# Patient Record
Sex: FEMALE | Race: WHITE | NOT HISPANIC OR LATINO | URBAN - METROPOLITAN AREA
[De-identification: names, ages, dates, MRNs, and addresses within clinical notes are randomized per-mention and may not be internally consistent; named-entity substitution may affect disease eponyms.]

---

## 2019-12-18 NOTE — PATIENT DISCUSSION
"""Recommended OTC artificial tears two - four times a day as needed. "" ""Continue Artificial tears both eyes two - four times a day

## 2022-07-28 NOTE — PATIENT DISCUSSION
Will send today's finding to patient's PCP.  Will recommend a carotid doppler as patient denies ever having had one done in the past.

## 2022-07-28 NOTE — PATIENT DISCUSSION
Symptoms started Monday. Patient reports difficult to obtain BP. BP was taking in office today and was 176/99 HR 71.

## 2022-09-02 NOTE — PATIENT DISCUSSION
Patient would like a new eyeglass prescription. Will call when she has money saved up to purchase new glasses. Will schedule patient for refraction when she calls.

## 2022-09-02 NOTE — PATIENT DISCUSSION
Patient sees PCP Dr. Sofia Sutton next week and will have results sent to us. Patient reports no new episodes of vison loss in several weeks. Will send today's visit to Dr. Kevin Camargo.

## 2022-09-02 NOTE — PATIENT DISCUSSION
Patient uses [employer]ilason Allergy eye drop. Finds relief with those drops. Provided patient sample of Pataday she can try and a coupon.

## 2023-04-17 ENCOUNTER — CONTACT LENSES/GLASSES VISIT (OUTPATIENT)
Dept: URBAN - METROPOLITAN AREA CLINIC 24 | Facility: CLINIC | Age: 28
End: 2023-04-17

## 2023-04-17 DIAGNOSIS — H18.603: ICD-10-CM

## 2023-04-17 PROCEDURE — 92310N CONTACT LENS F/U, 3 OR LESS N/C

## 2023-05-10 ENCOUNTER — CONTACT LENSES/GLASSES VISIT (OUTPATIENT)
Dept: URBAN - METROPOLITAN AREA CLINIC 50 | Facility: CLINIC | Age: 28
End: 2023-05-10

## 2023-05-10 DIAGNOSIS — H18.603: ICD-10-CM

## 2023-05-10 PROCEDURE — 92310N CONTACT LENS F/U, 3 OR LESS N/C

## 2023-06-30 ENCOUNTER — CONTACT LENSES/GLASSES VISIT (OUTPATIENT)
Dept: URBAN - METROPOLITAN AREA CLINIC 24 | Facility: CLINIC | Age: 28
End: 2023-06-30

## 2023-06-30 DIAGNOSIS — H18.603: ICD-10-CM

## 2023-06-30 PROCEDURE — 92310N CONTACT LENS F/U, 3 OR LESS N/C

## 2023-06-30 ASSESSMENT — VISUAL ACUITY
OD_CC: 20/60
OS_CC: 20/100-1

## 2023-07-17 ENCOUNTER — CONTACT LENSES/GLASSES VISIT (OUTPATIENT)
Dept: URBAN - METROPOLITAN AREA CLINIC 24 | Facility: CLINIC | Age: 28
End: 2023-07-17

## 2023-07-17 DIAGNOSIS — H18.603: ICD-10-CM

## 2023-07-17 PROCEDURE — 92310N CONTACT LENS F/U, 3 OR LESS N/C

## 2023-07-17 ASSESSMENT — VISUAL ACUITY
OU_CC: 20/15
OS_CC: 20/60+2
OD_CC: 20/15

## 2023-11-10 ENCOUNTER — COMPREHENSIVE EXAM (OUTPATIENT)
Dept: URBAN - METROPOLITAN AREA CLINIC 24 | Facility: CLINIC | Age: 28
End: 2023-11-10

## 2023-11-10 DIAGNOSIS — H53.2: ICD-10-CM

## 2023-11-10 DIAGNOSIS — Z01.01: ICD-10-CM

## 2023-11-10 PROCEDURE — 92014 COMPRE OPH EXAM EST PT 1/>: CPT

## 2023-11-10 PROCEDURE — 92060 SENSORIMOTOR EXAMINATION: CPT

## 2023-11-10 PROCEDURE — 92015 DETERMINE REFRACTIVE STATE: CPT

## 2023-11-10 ASSESSMENT — TONOMETRY
OD_IOP_MMHG: 09
OS_IOP_MMHG: 09

## 2023-11-10 ASSESSMENT — VISUAL ACUITY
OU_CC: 20/30
OU_CC: J1
OS_CC: 20/80
OD_CC: 20/30